# Patient Record
Sex: FEMALE | Race: ASIAN | NOT HISPANIC OR LATINO | Employment: FULL TIME | URBAN - METROPOLITAN AREA
[De-identification: names, ages, dates, MRNs, and addresses within clinical notes are randomized per-mention and may not be internally consistent; named-entity substitution may affect disease eponyms.]

---

## 2020-03-11 NOTE — PROGRESS NOTES
Chief Complaint   Patient presents with   • Elevated Hepatic Enzymes       HPI  40-year-old female presents today for follow-up regarding elevated liver function test.  He was a previous patient of our prior office.  She states her last office visit was 1 year ago.  With our other office she has had elevated liver enzyme work-up that has included liver serologies, ultrasound and CT scan as well as MRI with reported liver hemangiomas.    She presents today for elevated liver enzymes.  She has had blood work routinely with her primary care provider and her liver function tests were elevated 6 months ago and they were still elevated 1 week ago with her primary care provider.    Patient denies yellowing of the skin, mental status changes, increasing abdominal girth or pruritus.    She drinks alcohol regularly on the weekend between 2 and 4 malt beverages.  This has been a longstanding habit.    There is a family history of colon polyps in her mother and father.    She reports Attempted colonoscopy last year however they were unable to obtain IV access And flexible sigmoidoscopy was performed for complaints of diarrhea.  EGD was planned given symptoms of acid reflux however this was not performed given lack of IV access.    She made dietary changes approximately 6 months ago and is avoiding fast foods.  With dietary changes diarrhea has resolved.  She denies melena or hematochezia.  She can have oily stools at times.  She denies pain or cramping.    Acid reflux has also improved with dietary changes.  She denies pain or trouble with swallowing.    Despite dietary changes 6 months ago her weight has remained stable.    Review of Systems   Constitutional: Negative.    Eyes: Negative.    Respiratory: Negative.    Cardiovascular: Negative.    Gastrointestinal: Negative.    Endocrine: Negative.    Genitourinary: Negative.    Musculoskeletal: Positive for arthralgias, back pain and neck pain.   Skin: Negative.     Allergic/Immunologic: Negative.    Neurological: Positive for numbness.   Hematological: Negative.    Psychiatric/Behavioral: Positive for sleep disturbance. The patient is nervous/anxious.         Problem List:  There is no problem list on file for this patient.      Medical History:  History reviewed. No pertinent past medical history.     Social History:    Social History     Socioeconomic History   • Marital status:      Spouse name: Not on file   • Number of children: Not on file   • Years of education: Not on file   • Highest education level: Not on file   Tobacco Use   • Smoking status: Former Smoker     Years: 4.00   • Smokeless tobacco: Never Used   Substance and Sexual Activity   • Alcohol use: Yes   • Drug use: Never       Family History:   Family History   Problem Relation Age of Onset   • Liver cancer Father    • Stomach cancer Paternal Uncle    • Liver cancer Paternal Uncle    • Crohn's disease Paternal Grandmother    • Stomach cancer Paternal Grandmother        Surgical History:   Past Surgical History:   Procedure Laterality Date   • FLEXIBLE SIGMOIDOSCOPY  2019         Current Outpatient Medications:   •  amLODIPine (NORVASC) 2.5 MG tablet, , Disp: , Rfl:   •  losartan (COZAAR) 50 MG tablet, , Disp: , Rfl:     Allergies:  Patient has no known allergies.    The following portions of the patient's history were reviewed and updated as appropriate: allergies, current medications, past family history, past medical history, past social history, past surgical history and problem list.    Vitals:    03/12/20 0747   BP: 120/86   Pulse: 103   Temp: 98.6 °F (37 °C)   SpO2: 97%         03/12/20  0747   Weight: 82.1 kg (180 lb 14.4 oz)     Body mass index is 32.04 kg/m².    Physical Exam   Abdominal: Soft. Normal appearance and bowel sounds are normal. She exhibits no distension, no pulsatile midline mass and no mass. There is no tenderness. There is no rigidity, no rebound and no guarding.   Vitals  reviewed.        Assessment/ Plan  Jerilyn was seen today for elevated hepatic enzymes.    Diagnoses and all orders for this visit:    Elevated liver function tests  -     US Elastography Parenchyma; Future  -     REED Fibrosure    Fatty liver  -     US Elastography Parenchyma; Future  -     REED Fibrosure    Family history of polyps in the colon    Encounter for screening for malignant neoplasm of colon         Return for After procedures.    For fatty liver, weight loss is recommended. Recommend following a low fat and low sugar diet. Recommend management of diabetes and elevated cholesterol with primary care provider if indicated. Regular exercise is recommended.   Alcohol avoidance is recommended.    Screening Colonoscopy at age 42    Obtain FIBROSURE blood test    Schedule FIBROSCAN ultrasound to assess for fibrosis and fatty liver    Once these have resulted will make additional recommendations regarding elevated liver enzymes.  Discussion:  Have requested previous medical records and recent blood work from her primary care provider for review.  Will update her chart accordingly.    Long discussion regarding diagnosis of fatty liver and elevated liver enzymes.  Also discussed weight loss and alcohol use.  Patient was strongly encouraged for dietary and lifestyle modifications and monitoring/assessment for fibrosis was discussed.  Will proceed with fibro-sure and FibroScan blood test to assess for level of scarring and if these match, will monitor yearly with fibro-sure blood test.  If they do not match and show different levels of fibrosis, to consider liver biopsy as discussed.    Family history of colon polyps in her mother and father, recommend colonoscopy at age 42, this has been placed in our electronic reminder system.    Today's office visit was 40 minutes face to face in the office setting with greater than 50% of that time was spent counseling and coordinating care      REVIEW OF PREVIOUS RECORDS:   MRI  March 2019 with severe fatty infiltration and benign 9 mm small left hepatic hemangioma.  Patient is status post cholecystectomy in 2001.    March 21, 2019 flexible sigmoidoscopy.  Nonthrombosed external hemorrhoids otherwise normal.  Random colon biopsies unremarkable.    April 3, 2019.  Normal double contrast upper GI series.    Celiac serologies and liver serologies February 2019 with elevated ferritin otherwise normal.

## 2020-03-11 NOTE — PATIENT INSTRUCTIONS
For fatty liver, weight loss is recommended. Recommend following a low fat and low sugar diet. Recommend management of diabetes and elevated cholesterol with primary care provider if indicated. Regular exercise is recommended.   Alcohol avoidance is recommended.    Screening Colonoscopy at age 42    Obtain FIBROSURE blood test    Schedule FIBROSCAN ultrasound to assess for fibrosis and fatty liver    Once these have resulted will make additional recommendations regarding elevated liver enzymes.

## 2020-03-12 ENCOUNTER — OFFICE VISIT (OUTPATIENT)
Dept: GASTROENTEROLOGY | Facility: CLINIC | Age: 40
End: 2020-03-12

## 2020-03-12 VITALS
OXYGEN SATURATION: 97 % | DIASTOLIC BLOOD PRESSURE: 86 MMHG | SYSTOLIC BLOOD PRESSURE: 120 MMHG | HEART RATE: 103 BPM | HEIGHT: 63 IN | WEIGHT: 180.9 LBS | BODY MASS INDEX: 32.05 KG/M2 | TEMPERATURE: 98.6 F

## 2020-03-12 DIAGNOSIS — R79.89 ELEVATED LIVER FUNCTION TESTS: Primary | ICD-10-CM

## 2020-03-12 DIAGNOSIS — Z83.71 FAMILY HISTORY OF POLYPS IN THE COLON: ICD-10-CM

## 2020-03-12 DIAGNOSIS — Z12.11 ENCOUNTER FOR SCREENING FOR MALIGNANT NEOPLASM OF COLON: ICD-10-CM

## 2020-03-12 DIAGNOSIS — K76.0 FATTY LIVER: ICD-10-CM

## 2020-03-12 PROCEDURE — 99215 OFFICE O/P EST HI 40 MIN: CPT | Performed by: NURSE PRACTITIONER

## 2020-03-12 RX ORDER — LOSARTAN POTASSIUM 50 MG/1
50 TABLET ORAL DAILY
COMMUNITY
Start: 2020-02-15

## 2020-03-12 RX ORDER — AMLODIPINE BESYLATE 2.5 MG/1
2.5 TABLET ORAL DAILY
COMMUNITY
Start: 2020-02-15 | End: 2021-08-31 | Stop reason: ALTCHOICE

## 2020-03-14 LAB
A2 MACROGLOB SERPL-MCNC: 160 MG/DL (ref 110–276)
ALT SERPL W P-5'-P-CCNC: 144 IU/L (ref 0–40)
APO A-I SERPL-MCNC: 118 MG/DL (ref 116–209)
AST SERPL W P-5'-P-CCNC: 207 IU/L (ref 0–40)
BILIRUB SERPL-MCNC: 0.5 MG/DL (ref 0–1.2)
CHOLEST SERPL-MCNC: 207 MG/DL (ref 100–199)
FIBROSIS SCORING:: ABNORMAL
FIBROSIS STAGE SERPL QL: ABNORMAL
GGT SERPL-CCNC: 209 IU/L (ref 0–60)
GLUCOSE SERPL-MCNC: 101 MG/DL (ref 65–99)
HAPTOGLOB SERPL-MCNC: 90 MG/DL (ref 33–278)
INTERPRETATIONS: (REFERENCE): ABNORMAL
LABORATORY COMMENT REPORT: ABNORMAL
LIVER FIBR SCORE SERPL CALC.FIBROSURE: 0.29 (ref 0–0.21)
NASH SCORING (REFERENCE): ABNORMAL
NECROINFLAMMATORY ACT GRADE SERPL QL: ABNORMAL
NECROINFLAMMATORY ACT SCORE SERPL: 0.5
SERVICE CMNT-IMP: ABNORMAL
STEATOSIS GRADE (REFERENCE): ABNORMAL
STEATOSIS GRADING (REFERENCE): ABNORMAL
STEATOSIS SCORE (REFERENCE): 0.9 (ref 0–0.3)
TRIGL SERPL-MCNC: 127 MG/DL (ref 0–149)

## 2020-06-19 ENCOUNTER — TELEPHONE (OUTPATIENT)
Dept: GASTROENTEROLOGY | Facility: CLINIC | Age: 40
End: 2020-06-19

## 2020-06-19 NOTE — TELEPHONE ENCOUNTER
----- Message from ARIE Romo sent at 5/21/2020 10:28 AM EDT -----  Patient contacted the office requesting results of FibroScan.  I have requested this for review from [facility], performed on May 8.  Please let me know when this is available.  Thanks.

## 2020-06-19 NOTE — TELEPHONE ENCOUNTER
LM requesting this again 5/22/20 923am    Have called several times yesterday and once today and have gotten a busy signal. KMR 816am 6/5/20    LM again on med recs VM 6/9/20 828 am    LM again at [phone] opt 6 with Tracey Boston 950 am 6/9/20    Spoke Tracey crokcett # [phone] - results are still pending to be read buy the MD. KMR 6/10/20 1015 am    Spoke with Andreea - Dr. Raymundo is suppose to read these tomorrow. Should be ready to request next week. KMR 6/10/20 1pm    Spoke with Matt, requested from [facility] after Hep C clinic told me they do not send out results. KMR 6/16/20      Received and indexed to provider for review - 6/19/20 846 am

## 2020-06-24 ENCOUNTER — OFFICE VISIT (OUTPATIENT)
Dept: GASTROENTEROLOGY | Facility: CLINIC | Age: 40
End: 2020-06-24

## 2020-06-24 VITALS
BODY MASS INDEX: 30.78 KG/M2 | HEART RATE: 109 BPM | OXYGEN SATURATION: 97 % | SYSTOLIC BLOOD PRESSURE: 130 MMHG | WEIGHT: 173.7 LBS | DIASTOLIC BLOOD PRESSURE: 90 MMHG | TEMPERATURE: 98 F | HEIGHT: 63 IN

## 2020-06-24 DIAGNOSIS — R79.89 ELEVATED LIVER FUNCTION TESTS: ICD-10-CM

## 2020-06-24 DIAGNOSIS — K76.0 FATTY LIVER: Primary | ICD-10-CM

## 2020-06-24 DIAGNOSIS — R19.7 DIARRHEA FOLLOWING GASTROINTESTINAL SURGERY: ICD-10-CM

## 2020-06-24 DIAGNOSIS — Z98.890 DIARRHEA FOLLOWING GASTROINTESTINAL SURGERY: ICD-10-CM

## 2020-06-24 PROCEDURE — 99214 OFFICE O/P EST MOD 30 MIN: CPT | Performed by: NURSE PRACTITIONER

## 2020-06-24 RX ORDER — MONTELUKAST SODIUM 4 MG/1
1 TABLET, CHEWABLE ORAL 2 TIMES DAILY
Qty: 180 TABLET | Refills: 3 | Status: SHIPPED | OUTPATIENT
Start: 2020-06-24 | End: 2021-08-31 | Stop reason: ALTCHOICE

## 2020-06-24 RX ORDER — ALBUTEROL SULFATE 90 UG/1
2 AEROSOL, METERED RESPIRATORY (INHALATION)
COMMUNITY
Start: 2020-06-01

## 2020-06-24 RX ORDER — MONTELUKAST SODIUM 4 MG/1
TABLET, CHEWABLE ORAL
COMMUNITY
Start: 2020-06-15 | End: 2020-06-24 | Stop reason: SDUPTHER

## 2020-06-24 RX ORDER — MONTELUKAST SODIUM 10 MG/1
10 TABLET ORAL AS NEEDED
COMMUNITY
Start: 2020-06-01

## 2020-06-24 NOTE — PATIENT INSTRUCTIONS
Reviewed FibroSCAN, FIBROSURE, copies provided.    Orders placed for liver biopsy    Follow up 2 weeks after liver bioopsy to review results    Adjust colestipol as needed for diarrhea and urgency     For fatty liver, weight loss is strongly recommended. Recommend following a low fat and low sugar diet. Recommend management of diabetes and elevated cholesterol with primary care provider if indicated. Regular exercise is recommended. Alcohol avoidance is recommended.

## 2020-06-25 PROBLEM — K76.0 FATTY LIVER: Status: ACTIVE | Noted: 2020-06-25

## 2020-06-25 PROBLEM — Z83.71 FAMILY HISTORY OF POLYPS IN THE COLON: Status: ACTIVE | Noted: 2020-06-25

## 2020-06-25 PROBLEM — R79.89 ELEVATED LIVER FUNCTION TESTS: Status: ACTIVE | Noted: 2020-06-25

## 2020-06-25 NOTE — PROGRESS NOTES
Chief Complaint   Patient presents with   • Follow-up     sasha liver and review test results        HPI  40-year-old female presents today for follow-up after FibroScan and fibro-sure blood test.  Last office visit March 12, 2020.  Patient has a history of elevated liver enzymes.  Liver serologies were performed February 2019 with elevated ferritin otherwise normal.  MRI March 2019 with severe fatty infiltration and benign 9 mm small left hepatic hemangioma.  Patient is status post cholecystectomy in 2001.  Flexible sigmoidoscopy March 2019 and April 3, 2019 upper GI series, results summarized below.    Given elevated liver enzymes, fibro-sure blood test and FibroScan were recommended.  She presents today to discuss these results.    She denies yellowing of the skin, mental status changes, increasing abdominal girth or pruritus.    Given findings on recent FibroScan, the technician recommended that the patient avoid alcohol consumption and lose weight.  She has not had any alcohol Since her ultrasound was performed May 8, 2020.    May 8, 2020 FibroScan.  F for fibrosis with portal hypertension and recommendation to consider EGD to evaluate for esophageal varices.  The current scan was considered reliable.  Also moderate to severe fat present (S3).    REVIEW OF PREVIOUS RECORDS:   FIBROSCAN image below.    March 12, 2020 FIBROSURE:    Fibrosis score 0.29, F1 portal fibrosis.   steatosis score 0.90, S3 market or severe steatosis.  Bains grade and 1 borderline or probable Bains.  , , , total cholesterol 207, Glucose 101    MRI March 2019 with severe fatty infiltration and benign 9 mm small left hepatic hemangioma.  Patient is status post cholecystectomy in 2001.     March 21, 2019 flexible sigmoidoscopy.  Nonthrombosed external hemorrhoids otherwise normal.  Random colon biopsies unremarkable.     April 3, 2019.  Normal double contrast upper GI series.     Celiac serologies and liver serologies  February 2019 with elevated ferritin otherwise normal.    Review of Systems   Constitutional: Positive for appetite change.   HENT: Negative.    Eyes: Negative.    Respiratory: Negative.    Cardiovascular: Negative.    Gastrointestinal: Negative.    Endocrine: Negative.    Genitourinary: Negative.    Musculoskeletal: Positive for arthralgias, back pain, joint swelling and myalgias.   Skin: Negative.    Allergic/Immunologic: Negative.    Neurological: Negative.    Hematological: Negative.    Psychiatric/Behavioral: Negative.         Problem List:    Patient Active Problem List   Diagnosis   • Elevated liver function tests   • Fatty liver   • Family history of polyps in the colon       Medical History:    Past Medical History:   Diagnosis Date   • DDD (degenerative disc disease), lumbar    • Hypertension         Social History:    Social History     Socioeconomic History   • Marital status:      Spouse name: Not on file   • Number of children: Not on file   • Years of education: Not on file   • Highest education level: Not on file   Tobacco Use   • Smoking status: Former Smoker     Years: 4.00   • Smokeless tobacco: Never Used   Substance and Sexual Activity   • Alcohol use: Yes   • Drug use: Never       Family History:   Family History   Problem Relation Age of Onset   • Liver cancer Father    • Colon polyps Father    • Stomach cancer Paternal Uncle    • Liver cancer Paternal Uncle    • Crohn's disease Paternal Grandmother    • Stomach cancer Paternal Grandmother    • Colon polyps Mother    • Colon cancer Neg Hx        Surgical History:   Past Surgical History:   Procedure Laterality Date   • CHOLECYSTECTOMY     • COLONOSCOPY  2019 [place]    • FLEXIBLE SIGMOIDOSCOPY  2019   • LUMBAR DISC SURGERY             Current Outpatient Medications:   •  albuterol sulfate  (90 Base) MCG/ACT inhaler, , Disp: , Rfl:   •  amLODIPine (NORVASC) 2.5 MG tablet, , Disp: , Rfl:   •  colestipol (COLESTID) 1 g tablet,  Take 1 tablet by mouth 2 (Two) Times a Day., Disp: 180 tablet, Rfl: 3  •  Fluticasone Furoate-Vilanterol (Breo Ellipta) 200-25 MCG/INH inhaler, , Disp: , Rfl:   •  losartan (COZAAR) 50 MG tablet, , Disp: , Rfl:   •  montelukast (SINGULAIR) 10 MG tablet, , Disp: , Rfl:     Allergies:  Patient has no known allergies.    The following portions of the patient's history were reviewed and updated as appropriate: allergies, current medications, past family history, past medical history, past social history, past surgical history and problem list.                 Vitals:    06/24/20 0725   BP: 130/90   Pulse: 109   Temp: 98 °F (36.7 °C)   SpO2: 97%         06/24/20  0725   Weight: 78.8 kg (173 lb 11.2 oz)     Body mass index is 30.77 kg/m².    Physical Exam   Constitutional: She is oriented to person, place, and time. She appears well-developed and well-nourished.   HENT:   Head: Normocephalic and atraumatic.   Eyes: Pupils are equal, round, and reactive to light. No scleral icterus.   Cardiovascular: Normal rate, regular rhythm and normal heart sounds.   Pulmonary/Chest: Effort normal and breath sounds normal.   Abdominal: Soft. Bowel sounds are normal.   Neurological: She is alert and oriented to person, place, and time.   Skin: Skin is warm and dry. No rash noted. No erythema.   Vitals reviewed.        Assessment/ Plan  Jerilyn was seen today for follow-up.    Diagnoses and all orders for this visit:    Fatty liver    Elevated liver function tests    BMI 30.0-30.9,adult    Other orders  -     colestipol (COLESTID) 1 g tablet; Take 1 tablet by mouth 2 (Two) Times a Day.         Return in about 2 weeks (around 7/8/2020) for After procedures.    Patient Instructions   Reviewed FibroSCAN, FIBROSURE, copies provided.    Orders placed for liver biopsy    Follow up 2 weeks after liver bioopsy to review results    Adjust colestipol as needed for diarrhea and urgency     For fatty liver, weight loss is strongly recommended.  Recommend following a low fat and low sugar diet. Recommend management of diabetes and elevated cholesterol with primary care provider if indicated. Regular exercise is recommended. Alcohol avoidance is recommended.           Discussion:  Long discussion today regarding diagnosis of fatty liver and noninvasive testing methods that are incongruent.  Fibro-sure with F1 fibrosis and FibroScan with F4 fibrosis and portal hypertension.  Patient is aware that the management of F1 versus F4 fibrosis varies greatly as F4 fibrosis supports diagnosis of cirrhosis.  Given this large discrepancy, recommend liver biopsy.  Patient will continue to avoid alcohol and work on weight loss efforts.  Further recommendations will be made once liver biopsy has been performed.  If liver biopsy shows advanced fibrosis, will begin cirrhosis monitoring and refer to hepatology as discussed.      Addendum.  Liver biopsy resulted July 10, 2020.  Moderate steatosis, sinusoidal fibrosis, lobular inflammation, rare acidophil bodies, ballooning degeneration, mixed inflammatory infiltrate in the portal areas.  Features are those of steatohepatitis either alcoholic or nonalcoholic.  There is ballooning of the hepatocytes in numerous areas.  The grade is grade 2 AnD the stage is 3 as there is pericellular fibrosis and mary ann-portal and focal areas with early bridging.    Liver biopsy results discussed with patient.  Recommended to start vitamin EE, avoid all alcohol, strongly recommend weight loss efforts and orders placed for referral to [facility] hepatologist.    Patient reports some tenderness and discomfort with bloating after liver biopsy.  Due to discomfort she left work early today.  If this does not slowly improve over the next 36 hours, patient was instructed to contact the office for further recommendations.Xiomy

## 2020-07-10 ENCOUNTER — HOSPITAL ENCOUNTER (OUTPATIENT)
Dept: CT IMAGING | Facility: HOSPITAL | Age: 40
Discharge: HOME | End: 2020-07-10
Admitting: NURSE PRACTITIONER

## 2020-07-10 VITALS
RESPIRATION RATE: 16 BRPM | BODY MASS INDEX: 31.01 KG/M2 | SYSTOLIC BLOOD PRESSURE: 108 MMHG | HEIGHT: 63 IN | DIASTOLIC BLOOD PRESSURE: 71 MMHG | OXYGEN SATURATION: 96 % | TEMPERATURE: 97.3 F | WEIGHT: 175 LBS | HEART RATE: 62 BPM

## 2020-07-10 DIAGNOSIS — K76.0 FATTY LIVER: ICD-10-CM

## 2020-07-10 DIAGNOSIS — R79.89 ELEVATED LIVER FUNCTION TESTS: Primary | ICD-10-CM

## 2020-07-10 LAB — PLATELET # BLD AUTO: 176 10*3/MM3 (ref 140–450)

## 2020-07-10 PROCEDURE — 88307 TISSUE EXAM BY PATHOLOGIST: CPT | Performed by: NURSE PRACTITIONER

## 2020-07-10 PROCEDURE — 25010000002 MORPHINE PER 10 MG: Performed by: RADIOLOGY

## 2020-07-10 PROCEDURE — 77012 CT SCAN FOR NEEDLE BIOPSY: CPT

## 2020-07-10 PROCEDURE — 88313 SPECIAL STAINS GROUP 2: CPT | Performed by: NURSE PRACTITIONER

## 2020-07-10 PROCEDURE — 25010000003 LIDOCAINE 1 % SOLUTION: Performed by: RADIOLOGY

## 2020-07-10 PROCEDURE — 85049 AUTOMATED PLATELET COUNT: CPT | Performed by: RADIOLOGY

## 2020-07-10 RX ORDER — LIDOCAINE HYDROCHLORIDE 10 MG/ML
20 INJECTION, SOLUTION INFILTRATION; PERINEURAL ONCE
Status: COMPLETED | OUTPATIENT
Start: 2020-07-10 | End: 2020-07-10

## 2020-07-10 RX ORDER — MORPHINE SULFATE 2 MG/ML
2 INJECTION, SOLUTION INTRAMUSCULAR; INTRAVENOUS ONCE
Status: COMPLETED | OUTPATIENT
Start: 2020-07-10 | End: 2020-07-10

## 2020-07-10 RX ORDER — UREA 10 %
1 LOTION (ML) TOPICAL NIGHTLY
COMMUNITY
End: 2021-08-31 | Stop reason: ALTCHOICE

## 2020-07-10 RX ORDER — SODIUM CHLORIDE 0.9 % (FLUSH) 0.9 %
3 SYRINGE (ML) INJECTION EVERY 12 HOURS SCHEDULED
Status: DISCONTINUED | OUTPATIENT
Start: 2020-07-10 | End: 2020-07-11 | Stop reason: HOSPADM

## 2020-07-10 RX ORDER — SODIUM CHLORIDE 0.9 % (FLUSH) 0.9 %
1-10 SYRINGE (ML) INJECTION AS NEEDED
Status: DISCONTINUED | OUTPATIENT
Start: 2020-07-10 | End: 2020-07-11 | Stop reason: HOSPADM

## 2020-07-10 RX ADMIN — Medication 3 ML: at 11:55

## 2020-07-10 RX ADMIN — MORPHINE SULFATE 2 MG: 2 INJECTION, SOLUTION INTRAMUSCULAR; INTRAVENOUS at 11:48

## 2020-07-10 RX ADMIN — LIDOCAINE HYDROCHLORIDE 20 ML: 10 INJECTION, SOLUTION INFILTRATION; PERINEURAL at 10:32

## 2020-07-10 NOTE — NURSING NOTE
D/C instructions given to patient. Pt verbalized understanding and denies having any questions. Appropriate PPE worn by self and pt.

## 2020-07-10 NOTE — DISCHARGE INSTRUCTIONS
EDUCATION /DISCHARGE INSTRUCTIONS  CT/US guided biopsy:  A biopsy is a procedure done to remove tissue for further analysis.  Before images are taken to locate the target area.  Images can be obtained using ultrasound, CT or MRI.  A physician will clean your skin with antiseptic soap, place a sterile towel around the site and administer a local anesthetic to numb the area.  The physician will then insert a special needle.  Sometimes images are taken of the needle after it is inserted to ensure the needle is in the correct area to be biopsied.   A sample is obtained and sent to the laboratory for study.  Occasionally the laboratory is unable to make a diagnosis from the sample and the procedure may need to be repeated.  Within a week the radiologist will send a report to your physician.  A pathologist will also examine the tissue and send a report.    Risks of the procedure include but are not limited to:   *  Bleeding    *  Infection   *  Puncture of surrounding organs *  Death     *  Lung collapse if the biopsy is near the chest which may require insertion of a      chest tube to re-inflate the lung if severe.    Benefits of the procedure:  Using x-ray helps to locate the area that requires a biopsy. The procedure is less invasive than a surgical procedure, there are no large incisions and it does not require anesthesia.    Alternatives to the procedure:  A biopsy can be performed surgically.  Risks of a surgical biopsy include exposure to anesthesia, infection, excessive bleeding and injury to abdominal organs.  A benefit of surgical biopsy is the ability to see the area to be biopsied and remove of a larger piece of tissue.    THIS EDUCATION INFORMATION WAS REVIEWED PRIOR TO PROCEDURE AND CONSENT. Patient initials__________________Time_____0852______________    Post Procedure:    *  Expect the biopsy site may be tender up to one week.    *  Rest today (no pushing pulling or straining).   *  Slowly increase  activity tomorrow.    *  If you received sedation do not drive for 24 hours.   *  Keep dressing clean and dry.   *  Leave dressing on puncture site for 24 hours.    *  You may shower when dressing removed.  Call your doctor if experiencing:   *  Signs of infection such as redness, swelling, excessive pain and / or foul        smelling drainage from the puncture site.   *  Chills or fever over 101 degrees (by mouth).   *  Unrelieved pain.   *  Any new or severe symptoms.   *  If experiencing sudden / severe shortness of breath or chest pain go to the       nearest emergency room.   Following the procedure:     Follow-up with the ordering physician as directed.    Continue to take other medications as directed by your physician unless    otherwise instructed.   If applicable, resume taking your blood thinners or Aspirin on ____7/11/2020 after 11:30am _______.    If you have any concerns please call the Radiology Nurses Desk at [phone].  You are the most important factor in your recovery.  Follow the above instructions carefully.

## 2020-07-13 LAB
LAB AP CASE REPORT: NORMAL
LAB AP CLINICAL INFORMATION: NORMAL
LAB AP DIAGNOSIS COMMENT: NORMAL
PATH REPORT.FINAL DX SPEC: NORMAL
PATH REPORT.GROSS SPEC: NORMAL

## 2021-04-16 ENCOUNTER — BULK ORDERING (OUTPATIENT)
Dept: CASE MANAGEMENT | Facility: OTHER | Age: 41
End: 2021-04-16

## 2021-04-16 DIAGNOSIS — Z23 IMMUNIZATION DUE: ICD-10-CM

## 2021-08-31 ENCOUNTER — OFFICE VISIT (OUTPATIENT)
Dept: SURGERY | Facility: CLINIC | Age: 41
End: 2021-08-31

## 2021-08-31 VITALS — HEIGHT: 63 IN | WEIGHT: 165 LBS | BODY MASS INDEX: 29.23 KG/M2

## 2021-08-31 DIAGNOSIS — I87.8 POOR VENOUS ACCESS: Primary | ICD-10-CM

## 2021-08-31 PROCEDURE — 99203 OFFICE O/P NEW LOW 30 MIN: CPT | Performed by: SURGERY

## 2021-08-31 RX ORDER — ESCITALOPRAM OXALATE 5 MG/1
5 TABLET ORAL DAILY
COMMUNITY
Start: 2021-06-06

## 2021-08-31 RX ORDER — TRAZODONE HYDROCHLORIDE 50 MG/1
50 TABLET ORAL AS NEEDED
COMMUNITY

## 2021-08-31 RX ORDER — CALCIUM CARBONATE/VITAMIN D3 600 MG-10
1 TABLET ORAL DAILY
COMMUNITY
Start: 2021-08-20

## 2021-08-31 RX ORDER — MAGNESIUM OXIDE 400 MG/1
1 TABLET ORAL DAILY
COMMUNITY
Start: 2021-08-23

## 2021-08-31 RX ORDER — UREA 10 %
220 LOTION (ML) TOPICAL DAILY
COMMUNITY

## 2021-09-07 NOTE — PROGRESS NOTES
General Surgery  Initial Office Visit    CC: Poor venous access    HPI: The patient is a pleasant 41 y.o. year-old lady who presents today for discussion of a possible port placement due to chronic poor venous access from a longstanding history of IV heroin abuse.  She reports that she was recently diagnosed with likely Reed fatty infiltration of the liver and was referred to hepatologist for long-term management of this.  Some routine blood work was ordered, and has been unable to be obtained as her peripheral vasculature is so poor that multiple phlebotomies have been unable to draw enough blood for the gastroenterology service work-up.  She reports that she will need to have blood drawn about every 6 months and was therefore referred to see me for a possible port placement to help establish a site for routine blood draws.  She does not need any regular transfusions.  She states that she has been sober with no further IV drug use for the last 5 years.    Past Medical History:   Anxiety with depression  Asthma  Arthritis  GERD  Hypertension  Newly diagnosed liver disease, likely REED    Past Surgical History:   Liver biopsy   section x2  Lumbar spine fusion x2    Medications:   Losartan 50 mg daily  Zinc sulfate 220 mg daily  Calcium with vitamin D once daily  Magnesium oxide 400 mg daily  Lexapro 5 mg daily    Allergies: No known drug allergies    Family History: Father with history of hepatitis B associated liver cancer, mother with history of pemphigus vulgaris    Social History: , former smoker but quit in 2016, former illicit drug use including heroin and methamphetamine as well as marijuana but has been sober since 2016, no alcohol use regularly since 2016 when she also became sober from drinking    ROS:  Constitutional: Negative for fevers or chills  HENT: Negative for hearing loss or runny nose  Eyes: Positive for eye itching; negative for vision changes or scleral icterus  Respiratory:  Negative for cough or shortness of breath  Cardiovascular: Negative for chest pain or heart palpitations  Gastrointestinal: Positive for nausea, diarrhea, constipation, abdominal distention, and abdominal pain; Negative for vomiting, melena, or reflux  Genitourinary: Positive for flank pain; negative for hematuria or dysuria  Musculoskeletal: Positive for joint swelling and back pain; negative for joint pain, neck pain, or neck stiffness  Neurologic: Negative for headaches or dizziness  Psychiatric: Positive for depression; negative for anxiety or suicidal ideations  All other systems reviewed and negative      Physical Exam:  Height: 160 cm  Weight: 74.8 kg  BMI: 29.23  General: No acute distress, well-nourished & well-developed  HEAD: normocephalic, atraumatic  EYES: normal conjunctiva, sclera anicteric  EARS: grossly normal hearing  NECK: supple, no thyromegaly  CARDIOVASCULAR: regular rate and rhythm  RESPIRATORY: clear to auscultation bilaterally  GASTROINTESTINAL: soft, nontender, non-distended  MUSCULOSKELETAL: normal gait and station. No gross extremity abnormalities  PSYCHIATRIC: oriented x3, normal mood and affect    IMAGING:  None    ASSESSMENT & PLAN  Mrs. Aleman is a 41-year-old lady with very poor venous access given the longstanding history of IV drug abuse.  She is in need of twice yearly blood draws per her hepatologist and inquired today about the possibility of placing a port for long-term IV access to obtain blood for these sporadic tests.  I emphasized to her that I personally do not feel comfortable placing a port to satisfy her need for twice yearly blood draws, particularly in light of her propensity for IV drug use in the past knowing full well that placement of a port would be extremely risky and set her up for possible relapse and recurrent drug use.  Moreover, a port carries with it the risk for bacteremia, DVT formation, pneumothorax, etc. and these risks simply do not outweigh the  benefits of having a port for use only twice per year.  I would be happy to send her to a vascular surgeon for further evaluation of her peripheral vasculature and to discuss any alternative options other than an indwelling port.    Floresita Davila MD  General, Robotic, and Endoscopic Surgery  [facility]    [address], [address]  [place] [address]  P: [phone]  F: [phone]